# Patient Record
Sex: FEMALE | Race: WHITE | NOT HISPANIC OR LATINO | Employment: OTHER
[De-identification: names, ages, dates, MRNs, and addresses within clinical notes are randomized per-mention and may not be internally consistent; named-entity substitution may affect disease eponyms.]

---

## 2017-01-06 ENCOUNTER — HEALTH MAINTENANCE LETTER (OUTPATIENT)
Age: 52
End: 2017-01-06

## 2017-01-25 ENCOUNTER — TRANSFERRED RECORDS (OUTPATIENT)
Dept: FAMILY MEDICINE | Facility: CLINIC | Age: 52
End: 2017-01-25

## 2017-02-14 ENCOUNTER — TRANSFERRED RECORDS (OUTPATIENT)
Dept: FAMILY MEDICINE | Facility: CLINIC | Age: 52
End: 2017-02-14

## 2017-02-27 ENCOUNTER — OFFICE VISIT (OUTPATIENT)
Dept: FAMILY MEDICINE | Facility: CLINIC | Age: 52
End: 2017-02-27

## 2017-02-27 VITALS
DIASTOLIC BLOOD PRESSURE: 74 MMHG | HEIGHT: 60 IN | SYSTOLIC BLOOD PRESSURE: 116 MMHG | WEIGHT: 204.4 LBS | HEART RATE: 57 BPM | OXYGEN SATURATION: 98 % | BODY MASS INDEX: 40.13 KG/M2 | TEMPERATURE: 98.6 F

## 2017-02-27 DIAGNOSIS — N95.1 PERIMENOPAUSAL: ICD-10-CM

## 2017-02-27 DIAGNOSIS — Z01.818 PREOP GENERAL PHYSICAL EXAM: Primary | ICD-10-CM

## 2017-02-27 PROCEDURE — 99396 PREV VISIT EST AGE 40-64: CPT | Performed by: FAMILY MEDICINE

## 2017-02-27 NOTE — NURSING NOTE
Jyocelyn is here for a non-fasting physical and pap.     Patient is here for a full physical exam and pap.  Pre-Visit Screening :  Immunizations : up todate  Colonoscopy : is up to date  Mammogram : is up to date  Asthma Action Plan/Test : na  PHQ9/GAD7 : na  Pulse - regular  Medication Reconciliation: complete    [unfilled]  ETOH screening:  Questions:  1-How often do you have a drink containing alcohol?                             1 times per week(s)  2-How many drinks containing alcohol do you have on a typical day when you are         Drinking?                              2   3- How often do you have 5 or more drinks on one occasion?                              No     Have you ever:  @None of the patient's responses to the CAGE screening were positive / Negative CAGE score@    Kamryn.LANA Almaguer ([facility])

## 2017-02-27 NOTE — PROGRESS NOTES
SUBJECTIVE:  Joycelyn Mccurdy is a 51 year old G 0 P 0 woman who presents for   annual gyn exam. Patient's last menstrual period was January 10 - previous 8 months before that    Periods are : infrequent  Menopausal symptoms (FSH 68 on 10/2016);  Minimal hot flashes- feels better    Current contraception: none   TUYET exposure: no  History of abnormal Pap smear: yes -   Family history of uterine or ovarian cancer: no  Regular self breast exam: yes  History of abnormal mammogram: no  Family history of breast cancer: no  History of abnormal lipids: lipid  normal     Health Care Maintenance  Pap smear 2015 Normal  Mammogram 2016- Normal  Colonoscopy 2013 - repeat 2018  Immunizations : up to date  Exercise:  10,000 steps daily  Vitamin D : weekly 50,000 once a week    Patient Active Problem List   Diagnosis     Toxic diffuse goiter     Hypothyroidism, postablative     ACP (advance care planning)     Health Care Home     Metatarsalgia of right foot     Tubular adenoma of colon/ colonoscopy due 2018     Past Surgical History   Procedure Laterality Date     C excis knee cartilage,medial or lat  1995     C infuse radioactive materials       hyperthyroid     Colonoscopy  2013     tubular adenoma/ sessile polyp: rpt 5     Family History   Problem Relation Age of Onset     Hypertension Mother      GASTROINTESTINAL DISEASE Father      gallbladder     GASTROINTESTINAL DISEASE Paternal Grandmother      gallbladder     GASTROINTESTINAL DISEASE Paternal Uncle      gallbladder     DIABETES Father      AODM     CANCER Father      lymphoma age 71     Hypertension Sister      CANCER Mother      lung/  age 77     Current Outpatient Prescriptions   Medication     phentermine 37.5 MG capsule     topiramate (TOPAMAX) 100 MG tablet     Cholecalciferol (VITAMIN D PO)     LEVOTHYROXINE SODIUM 137 MCG OR TABS     No current facility-administered medications for this visit.      Review Of Systems  Skin:small  papule under right breast-   Eyes: due for eye exam  Ears/Nose/Throat: negative  Respiratory: No shortness of breath, dyspnea on exertion, cough, or hemoptysis  Cardiovascular: negative  Gastrointestinal: negative  Genitourinary: no problems  Musculoskeletal: negative  Neurologic: negative  Psychiatric: negative  Hematologic/Lymphatic/Immunologic: negative  Endocrine: blood work up to date  [facility] - labs reviewed    OBJECTIVE:  /74 (BP Location: Left arm, Patient Position: Chair, Cuff Size: Adult Large)  Pulse 57  Temp 98.6  F (37  C) (Oral)  Ht 1.524 m (5')  Wt 92.7 kg (204 lb 6.4 oz)  LMP 01/10/2017  SpO2 98%  BMI 39.92 kg/m2  General appearance: Healthy    Skin: Normal. No atypical appearing moles on inspection of trunk and extremities.    External ears  and canals clear bilaterally. TM's normal bilaterally. Nose normal without lesions or discharge. Oropharynx normal. Neck supple without palpable adenopathy.    Breasts are symmetric.  No dominant, discrete, fixed  or suspicious masses are noted.  No skin or nipple changes or axillary nodes.     Regular rate and  rhythm. S1 and S2 normal, no murmurs, clicks, gallops or rubs. No edema or JVD. Chest is clear; no wheezes or rales.    The abdomen is soft without tenderness, guarding, mass or organomegaly. Bowel sounds are normal. No CVA tenderness or inguinal adenopathy noted.    Pelvic:  Deferred/ no complaints of pelvic pain, discharge.    Rectal exam: deferred    Extremities: negative.    Assessment    (Z01.818) Preop general physical exam  (primary encounter diagnosis)  Comment:   Plan: recheck one year- labs and medications through [facility]    (N95.1) Perimenopausal  Comment:   Plan: talked about HRT- really no indication with her minimal symptoms

## 2017-02-27 NOTE — MR AVS SNAPSHOT
After Visit Summary   2/27/2017    Joycelyn Mccurdy    MRN: [id number]           Patient Information     Date Of Birth          1965        Visit Information        Provider Department      2/27/2017 4:30 PM Loretta Toney MD [facility]        Today's Diagnoses     Preop general physical exam    -  1    Perimenopausal           Follow-ups after your visit        Follow-up notes from your care team     Return in about 1 year (around 2/27/2018) for Routine Visit.      Who to contact     If you have questions or need follow up information about today's clinic visit or your schedule please contact [facility] directly at [phone].  Normal or non-critical lab and imaging results will be communicated to you by MyChart, letter or phone within 4 business days after the clinic has received the results. If you do not hear from us within 7 days, please contact the clinic through [employer] or phone. If you have a critical or abnormal lab result, we will notify you by phone as soon as possible.  Submit refill requests through [employer] or call your pharmacy and they will forward the refill request to us. Please allow 3 business days for your refill to be completed.          Additional Information About Your Visit        MyChart Information     [employer] gives you secure access to your electronic health record. If you see a primary care provider, you can also send messages to your care team and make appointments. If you have questions, please call your primary care clinic.  If you do not have a primary care provider, please call [phone] and they will assist you.        Care EveryWhere ID     This is your Care EveryWhere ID. This could be used by other organizations to access your [place] medical records  [id number]        Your Vitals Were     Pulse Temperature Height Last Period Pulse Oximetry BMI (Body Mass Index)    57 98.6  F (37  C) (Oral) 1.524 m  (5') 01/10/2017 98% 39.92 kg/m2       Blood Pressure from Last 3 Encounters:   02/27/17 116/74   11/02/15 124/64   04/21/14 106/64    Weight from Last 3 Encounters:   02/27/17 92.7 kg (204 lb 6.4 oz)   11/02/15 93.3 kg (205 lb 9.6 oz)   04/21/14 95.4 kg (210 lb 6.4 oz)              Today, you had the following     No orders found for display       Primary Care Provider Office Phone # Fax #    Loretta Toney [phone]       [facility] [address] [facility] [address]  [facility] [address]        Thank you!     Thank you for choosing [facility]  for your care. Our goal is always to provide you with excellent care. Hearing back from our patients is one way we can continue to improve our services. Please take a few minutes to complete the written survey that you may receive in the mail after your visit with us. Thank you!             Your Updated Medication List - Protect others around you: Learn how to safely use, store and throw away your medicines at www.disposemymeds.org.          This list is accurate as of: 2/27/17  5:16 PM.  Always use your most recent med list.                   Brand Name Dispense Instructions for use    levothyroxine 137 MCG tablet    SYNTHROID/LEVOTHROID    30    1 TABLET DAILY       phentermine 37.5 MG capsule     30 capsule    Take 1 capsule (37.5 mg) by mouth every morning       topiramate 100 MG tablet    TOPAMAX    1 tablet    One daily       VITAMIN D PO      Take 50,000 Units by mouth every 7 days.

## 2017-05-03 ENCOUNTER — TRANSFERRED RECORDS (OUTPATIENT)
Dept: FAMILY MEDICINE | Facility: CLINIC | Age: 52
End: 2017-05-03

## 2017-05-10 ENCOUNTER — TRANSFERRED RECORDS (OUTPATIENT)
Dept: FAMILY MEDICINE | Facility: CLINIC | Age: 52
End: 2017-05-10

## 2017-11-16 ENCOUNTER — TRANSFERRED RECORDS (OUTPATIENT)
Dept: FAMILY MEDICINE | Facility: CLINIC | Age: 52
End: 2017-11-16

## 2018-01-04 ENCOUNTER — OFFICE VISIT (OUTPATIENT)
Dept: FAMILY MEDICINE | Facility: CLINIC | Age: 53
End: 2018-01-04

## 2018-01-04 VITALS
OXYGEN SATURATION: 99 % | DIASTOLIC BLOOD PRESSURE: 88 MMHG | WEIGHT: 206.2 LBS | BODY MASS INDEX: 40.48 KG/M2 | HEIGHT: 60 IN | HEART RATE: 52 BPM | SYSTOLIC BLOOD PRESSURE: 128 MMHG | TEMPERATURE: 97.7 F

## 2018-01-04 DIAGNOSIS — E66.01 MORBID OBESITY (H): ICD-10-CM

## 2018-01-04 DIAGNOSIS — Z01.419 ENCOUNTER FOR GYNECOLOGICAL EXAMINATION WITHOUT ABNORMAL FINDING: Primary | ICD-10-CM

## 2018-01-04 DIAGNOSIS — J01.00 ACUTE MAXILLARY SINUSITIS, RECURRENCE NOT SPECIFIED: ICD-10-CM

## 2018-01-04 DIAGNOSIS — Z12.4 SCREENING FOR CERVICAL CANCER: ICD-10-CM

## 2018-01-04 PROCEDURE — 88142 CYTOPATH C/V THIN LAYER: CPT | Mod: 90 | Performed by: PHYSICIAN ASSISTANT

## 2018-01-04 PROCEDURE — 87624 HPV HI-RISK TYP POOLED RSLT: CPT | Mod: 90 | Performed by: PHYSICIAN ASSISTANT

## 2018-01-04 PROCEDURE — 99396 PREV VISIT EST AGE 40-64: CPT | Performed by: PHYSICIAN ASSISTANT

## 2018-01-04 NOTE — PROGRESS NOTES
Chief Complaint:  Physical Exam    SUBJECTIVE:   Joycelyn Mccurdy is a 52 year old female presents for routine health maintenance.    Current concerns: Sinus infection for 2 weeks. Not getting better.     Menses are absent    Patient's last menstrual period was 01/10/2017.    Was last Pap smear normal: Yes  Due for mammogram:  No    Body mass index is 40.27 kg/(m^2).    Present exercise habits:  Nothing formal. Planning to add weight training. [facility] follows for weight  Present dietary habits:  eats regular meals and follows a balanced nutrition diet    Calcium intake: 1-2 servings daily  Vit D intake: is not taking supplement    Is the patient a smoker? No  If yes, smoking cessation advised and counseling provided.     Cardiovascular risk factors: obesity    Over the past few weeks, have you felt down or depressed? Little interest or pleasure in doing things? No concerns    If in a relationship are there any Domestic violence concern: No    Last dental appointment:  this year  Last optical appointment:  this year    Was the patient born between [birth year] and has not had Hep C testing?  No, not applicable    I have reviewed the following histories: Past Medical History, Past Surgical History, Social History, Family History, Problem List, Medication List and Allergies    Past Medical History:   Diagnosis Date     Other postablative hypothyroidism      Family History   Problem Relation Age of Onset     Hypertension Mother      CANCER Mother      lung/  age 77     GASTROINTESTINAL DISEASE Father      gallbladder     DIABETES Father      AODM     CANCER Father      lymphoma age 71     GASTROINTESTINAL DISEASE Paternal Grandmother      gallbladder     GASTROINTESTINAL DISEASE Paternal Uncle      gallbladder     Hypertension Sister      Social History     Social History     Marital status:      Spouse name: N/A     Number of children: N/A     Years of education: N/A     Occupational History     Not on file.      Social History Main Topics     Smoking status: Former Smoker     Smokeless tobacco: Never Used      Comment: occasionally     Alcohol use 1.0 oz/week     2 Standard drinks or equivalent per week     Drug use: No     Sexual activity: Yes     Partners: Male     Other Topics Concern     Not on file     Social History Narrative     Past Surgical History:   Procedure Laterality Date     C EXCIS KNEE CARTILAGE,MEDIAL OR LAT  1995     C INFUSE RADIOACTIVE MATERIALS  2/99    hyperthyroid     COLONOSCOPY  12/2013    tubular adenoma/ sessile polyp: rpt 5       ROS:  E/M: NEGATIVE for ear, nose, mouth and throat problems  R: NEGATIVE for significant/chronic cough or SOB  CV: NEGATIVE for chest pain or palpitations  GI: NEGATIVE for abdominal pain, chronic diarrhea or constipation  :  NEGATIVE for dysuria, hematuria or vaginal discharge. No sexual health concerns.       Current Outpatient Prescriptions   Medication     amoxicillin-clavulanate (AUGMENTIN) 875-125 MG per tablet     phentermine 37.5 MG capsule     topiramate (TOPAMAX) 100 MG tablet     Cholecalciferol (VITAMIN D PO)     LEVOTHYROXINE SODIUM 137 MCG OR TABS     No current facility-administered medications for this visit.        Patient Active Problem List    Diagnosis Date Noted     Morbid obesity (H) 01/04/2018     Priority: Medium     Tubular adenoma of colon/ colonoscopy due 12/2018 11/02/2015     Priority: Medium     Metatarsalgia of right foot 04/22/2014     Priority: Medium     Hypothyroidism, postablative      Priority: Medium     Problem list name updated by automated process. Provider to review       Toxic diffuse goiter      Priority: Medium     Problem list name updated by automated process. Provider to review       Health Care Home 06/18/2013     Priority: Low     State Tier Level:  Tier 1  Status:  n/a  Care Coordinator: n/a    See Letters for H Care Plan                                                       ACP (advance care planning)  "05/24/2012     Priority: Low     Advance Care Planning 11/2/2015: ACP Review and Resources Provided:  Reviewed chart for advance care plan.  Joycelyn Mccurdy has no plan or code status on file. Discussed available resources and provided with information. Confirmed code status reflects current choices pending further ACP discussions.  Confirmed/documented legally designated decision maker(s). Added by Jess Leija                 OBJECTIVE:  /88 (BP Location: Left arm, Patient Position: Chair, Cuff Size: Adult Large)  Pulse 52  Temp 97.7  F (36.5  C) (Oral)  Ht 1.524 m (5')  Wt 93.5 kg (206 lb 3.2 oz)  LMP 01/10/2017  SpO2 99%  BMI 40.27 kg/m2        General: 52 year old female who appears her stated age. Vital signs noted  Head: Normocephalic  Eyes: pupils equal round reactive to light and accomodation, extra ocular movements intact  Ears: external canals and TMs free of abnormalities  Nose: patent, without mucosal abnormalities  Mouth and throat: without erythema or lesions of the mucosa  Neck: supple, without adenopathy or thyromegaly  Lungs: clear to auscultation, no wheezing or crackles  Breasts: skin without rash, no dominant mass, no nipple discharge, or axillary adenopathy  Cv: regular rate and rhythm, normal s1 and s2 without murmur or click  Abd: soft, non-tender, no masses, no hepatomegaly or splenomegaly.   (female): normal female external genitalia, normal urethral meatus, vaginal mucosa, normal cervix/adnexa/uterus without masses or discharge  Ms: normal muscle tone & symmetry  Skin: clear to inspection and with no palpable abnormalities.  Neuro: sensation and motor function grossly intact; cranial nerves without obvious abnormalities.    ASSESSMENT/PLAN:    1. Encounter for gynecological examination without abnormal finding  2. Morbid obesity (H)  Joycelyn is doing well, and will hopefully begin to see some weight loss working with \"Dr. Roper\" at [facility]. I do not have records of her " latest labs there, but she will request them for me.     3. Screening for cervical cancer  Will do co-testing and contact via [employer] with results when available.   - ThinPrep Pap and HPV mRna E6/E7 ([facility])    4. Acute maxillary sinusitis, recurrence not specified  Given duration of symptoms, recommended she complete 10 day course of Augmentin. She should take this twice daily with food, and should consider taking probiotic or eating yogurt in between. Warned her of possible side effects including loose stool.  She should contact me in 1 week if symptoms are not improving, or sooner with any intolerable side effects or worsening symptoms.  - amoxicillin-clavulanate (AUGMENTIN) 875-125 MG per tablet; Take 1 tablet by mouth 2 times daily for 10 days  Dispense: 20 tablet; Refill: 0     reports that she has quit smoking. She has never used smokeless tobacco.      Estimated body mass index is 40.27 kg/(m^2) as calculated from the following:    Height as of this encounter: 1.524 m (5').    Weight as of this encounter: 93.5 kg (206 lb 3.2 oz).  Weight management plan: Patient referred to endocrine and/or weight management specialty      Labs pending:        Pap smear  Meds Suggested:      Vitamin D       Calcium  Tests Recommended:      Regular Dental Examinations        Eye exam  Behavior Modifications:       Cardiovascular exercise 3 times per week--enough to get your Target Heart rate  Other recommendations:     BMI noted and discussed      Regular breast exam     Encouraged My Chart    Counseling Resources:  ATP IV Guidelines  Pooled Cohorts Equation Calculator  Breast Cancer Risk Calculator  FRAX Risk Assessment  ICSI Preventive Guidelines  Dietary Guidelines for Americans, 2010  USDA's MyPlate            Carly Schmidt PA-C  1/4/2018

## 2018-01-04 NOTE — MR AVS SNAPSHOT
After Visit Summary   1/4/2018    Joycelyn Mccurdy    MRN: [id number]           Patient Information     Date Of Birth          1965        Visit Information        Provider Department      1/4/2018 11:00 AM Carly Schmidt PA-C [facility]        Today's Diagnoses     Encounter for gynecological examination without abnormal finding    -  1    Morbid obesity (H)        Screening for cervical cancer        Acute maxillary sinusitis, recurrence not specified           Follow-ups after your visit        Follow-up notes from your care team     Return in about 1 year (around 1/4/2019) for Physical Exam.      Who to contact     If you have questions or need follow up information about today's clinic visit or your schedule please contact [facility] directly at [phone].  Normal or non-critical lab and imaging results will be communicated to you by MyChart, letter or phone within 4 business days after the clinic has received the results. If you do not hear from us within 7 days, please contact the clinic through MyChart or phone. If you have a critical or abnormal lab result, we will notify you by phone as soon as possible.  Submit refill requests through [employer] or call your pharmacy and they will forward the refill request to us. Please allow 3 business days for your refill to be completed.          Additional Information About Your Visit        MyChart Information     [employer] gives you secure access to your electronic health record. If you see a primary care provider, you can also send messages to your care team and make appointments. If you have questions, please call your primary care clinic.  If you do not have a primary care provider, please call [phone] and they will assist you.        Care EveryWhere ID     This is your Care EveryWhere ID. This could be used by other organizations to access your [facility]  records  [id number]        Your Vitals Were     Pulse Temperature Height Last Period Pulse Oximetry BMI (Body Mass Index)    52 97.7  F (36.5  C) (Oral) 1.524 m (5') 01/10/2017 99% 40.27 kg/m2       Blood Pressure from Last 3 Encounters:   01/04/18 128/88   02/27/17 116/74   11/02/15 124/64    Weight from Last 3 Encounters:   01/04/18 93.5 kg (206 lb 3.2 oz)   02/27/17 92.7 kg (204 lb 6.4 oz)   11/02/15 93.3 kg (205 lb 9.6 oz)              We Performed the Following     ThinPrep Pap and HPV mRna E6/E7 ([facility])          Today's Medication Changes          These changes are accurate as of: 1/4/18  3:20 PM.  If you have any questions, ask your nurse or doctor.               Start taking these medicines.        Dose/Directions    amoxicillin-clavulanate 875-125 MG per tablet   Commonly known as:  AUGMENTIN   Used for:  Acute maxillary sinusitis, recurrence not specified   Started by:  Carly Schmidt PA-C        Dose:  1 tablet   Take 1 tablet by mouth 2 times daily for 10 days   Quantity:  20 tablet   Refills:  0            Where to get your medicines      Some of these will need a paper prescription and others can be bought over the counter.  Ask your nurse if you have questions.     Bring a paper prescription for each of these medications     amoxicillin-clavulanate 875-125 MG per tablet                Primary Care Provider Office Phone # Fax #    Loretta Toney [phone] [address] [address]        Equal Access to Services     [facility]: Hadii leana caceres hadasho Sonataly, waaxda luqadaha, qaybta kaalmada adedariel, adelfo hernández. So [facility] [phone].    ATENCIÓN: Si habla español, tiene a park disposición servicios gratuitos de asistencia lingüística. Llame al [phone].    We comply with applicable federal civil rights laws and [place] laws. We do not discriminate on the basis of race, color, national origin, age,  disability, sex, sexual orientation, or gender identity.            Thank you!     Thank you for choosing [facility]  for your care. Our goal is always to provide you with excellent care. Hearing back from our patients is one way we can continue to improve our services. Please take a few minutes to complete the written survey that you may receive in the mail after your visit with us. Thank you!             Your Updated Medication List - Protect others around you: Learn how to safely use, store and throw away your medicines at www.disposemymeds.org.          This list is accurate as of: 1/4/18  3:20 PM.  Always use your most recent med list.                   Brand Name Dispense Instructions for use Diagnosis    amoxicillin-clavulanate 875-125 MG per tablet    AUGMENTIN    20 tablet    Take 1 tablet by mouth 2 times daily for 10 days    Acute maxillary sinusitis, recurrence not specified       levothyroxine 137 MCG tablet    SYNTHROID/LEVOTHROID    30    1 TABLET DAILY    Other postablative hypothyroidism       phentermine 37.5 MG capsule     30 capsule    Take 1 capsule (37.5 mg) by mouth every morning        topiramate 100 MG tablet    TOPAMAX    1 tablet    One daily        VITAMIN D PO      Take 50,000 Units by mouth every 7 days.

## 2018-01-04 NOTE — NURSING NOTE
Joycelyn is here for CPX with pap no blood work necessary per pt    Patient is here for a full physical exam.    Pre-Visit Screening :  Immunizations : up to date  Colon Screening : is up to date  Mammogram: UTD  Asthma Action Test/Plan : NA  PHQ9 :  None  GAD7 :  None  Patient's  BMI Body mass index is 40.27 kg/(m^2).  Questioned patient about current smoking habits.  Pt. quit smoking some time ago.  OK to leave a detailed voice message regarding today's visit Yes, phone # [phone]      ETOH screening:  Questions:  1-How often do you have a drink containing alcohol?                             2 times per month(s)  2-How many drinks containing alcohol do you have on a typical day when you are         Drinking?                              2   3- How often do you have 5 or more drinks on one occasion?                              Never

## 2018-01-09 LAB
CLINICAL HISTORY - QUEST: NORMAL
CYTOTECHNOLOGIST - QUEST: NORMAL
DESCRIPTIVE DIAGNOSIS - QUEST: NORMAL
HPV MRNA E6/E7: NOT DETECTED
LAST PAP DX - QUEST: NORMAL
LMP - QUEST: NORMAL
PREV BX DX - QUEST: NORMAL
SOURCE: NORMAL
STATEMENT OF ADEQUACY - QUEST: NORMAL

## 2018-01-31 ENCOUNTER — TRANSFERRED RECORDS (OUTPATIENT)
Dept: FAMILY MEDICINE | Facility: CLINIC | Age: 53
End: 2018-01-31

## 2018-11-30 ENCOUNTER — TRANSFERRED RECORDS (OUTPATIENT)
Dept: FAMILY MEDICINE | Facility: CLINIC | Age: 53
End: 2018-11-30

## 2019-02-19 ENCOUNTER — TRANSFERRED RECORDS (OUTPATIENT)
Dept: FAMILY MEDICINE | Facility: CLINIC | Age: 54
End: 2019-02-19

## 2019-09-27 ENCOUNTER — HEALTH MAINTENANCE LETTER (OUTPATIENT)
Age: 54
End: 2019-09-27

## 2019-10-10 ENCOUNTER — TRANSFERRED RECORDS (OUTPATIENT)
Dept: FAMILY MEDICINE | Facility: CLINIC | Age: 54
End: 2019-10-10

## 2020-02-18 ENCOUNTER — TRANSFERRED RECORDS (OUTPATIENT)
Dept: FAMILY MEDICINE | Facility: CLINIC | Age: 55
End: 2020-02-18

## 2021-01-09 ENCOUNTER — HEALTH MAINTENANCE LETTER (OUTPATIENT)
Age: 56
End: 2021-01-09

## 2021-10-23 ENCOUNTER — HEALTH MAINTENANCE LETTER (OUTPATIENT)
Age: 56
End: 2021-10-23

## 2021-12-18 ENCOUNTER — HEALTH MAINTENANCE LETTER (OUTPATIENT)
Age: 56
End: 2021-12-18

## 2022-02-12 ENCOUNTER — HEALTH MAINTENANCE LETTER (OUTPATIENT)
Age: 57
End: 2022-02-12

## 2022-10-09 ENCOUNTER — HEALTH MAINTENANCE LETTER (OUTPATIENT)
Age: 57
End: 2022-10-09

## 2023-01-23 ENCOUNTER — TRANSFERRED RECORDS (OUTPATIENT)
Dept: FAMILY MEDICINE | Facility: CLINIC | Age: 58
End: 2023-01-23

## 2023-03-25 ENCOUNTER — HEALTH MAINTENANCE LETTER (OUTPATIENT)
Age: 58
End: 2023-03-25

## 2024-01-06 ENCOUNTER — HEALTH MAINTENANCE LETTER (OUTPATIENT)
Age: 59
End: 2024-01-06

## 2024-03-27 ENCOUNTER — OFFICE VISIT (OUTPATIENT)
Dept: FAMILY MEDICINE | Facility: CLINIC | Age: 59
End: 2024-03-27

## 2024-03-27 VITALS
SYSTOLIC BLOOD PRESSURE: 114 MMHG | DIASTOLIC BLOOD PRESSURE: 72 MMHG | BODY MASS INDEX: 41.62 KG/M2 | TEMPERATURE: 98 F | OXYGEN SATURATION: 99 % | HEART RATE: 65 BPM | HEIGHT: 60 IN | WEIGHT: 212 LBS

## 2024-03-27 DIAGNOSIS — E66.01 MORBID OBESITY (H): ICD-10-CM

## 2024-03-27 DIAGNOSIS — Z01.419 ENCOUNTER FOR GYNECOLOGICAL EXAMINATION WITHOUT ABNORMAL FINDING: Primary | ICD-10-CM

## 2024-03-27 DIAGNOSIS — Z12.31 ENCOUNTER FOR SCREENING MAMMOGRAM FOR BREAST CANCER: ICD-10-CM

## 2024-03-27 DIAGNOSIS — Z12.4 SCREENING FOR CERVICAL CANCER: ICD-10-CM

## 2024-03-27 DIAGNOSIS — E89.0 HYPOTHYROIDISM, POSTABLATIVE: ICD-10-CM

## 2024-03-27 DIAGNOSIS — Z12.11 SPECIAL SCREENING FOR MALIGNANT NEOPLASMS, COLON: ICD-10-CM

## 2024-03-27 PROCEDURE — 88175 CYTOPATH C/V AUTO FLUID REDO: CPT | Mod: 90 | Performed by: PHYSICIAN ASSISTANT

## 2024-03-27 PROCEDURE — 87624 HPV HI-RISK TYP POOLED RSLT: CPT | Mod: 90 | Performed by: PHYSICIAN ASSISTANT

## 2024-03-27 PROCEDURE — 99459 PELVIC EXAMINATION: CPT | Performed by: PHYSICIAN ASSISTANT

## 2024-03-27 PROCEDURE — 99396 PREV VISIT EST AGE 40-64: CPT | Performed by: PHYSICIAN ASSISTANT

## 2024-03-27 RX ORDER — ERGOCALCIFEROL 1.25 MG/1
50000 CAPSULE, LIQUID FILLED ORAL WEEKLY
COMMUNITY
Start: 2023-07-26

## 2024-03-27 RX ORDER — AMOXICILLIN 500 MG/1
2000 TABLET, FILM COATED ORAL
COMMUNITY
Start: 2023-10-30

## 2024-03-27 NOTE — NURSING NOTE
"Chief Complaint   Patient presents with    Physical     Non fasting px with PAP  goes to endo every October and does labs, pt will get results sent to [facility]    New Patient     New \"old\" patient    Referral     Discuss referral to derm for dark spots         Pre-visit Screening:  Immunizations:  not up to date - Shingrix and RSV at pharmacy  Colonoscopy:  is scheduled for next month  Mammogram: is scheduled for next month  Asthma Action Test/Plan:  NA  PHQ9:  NA  GAD7:  NA  Questioned patient about current smoking habits Pt. has never smoked.  Ok to leave detailed message on voice mail for today's visit only Yes, phone # [phone]      "

## 2024-03-27 NOTE — PROGRESS NOTES
Chief Complaint:  Physical Exam    SUBJECTIVE:   Joycelyn Mccurdy is a 58 year old female presents for routine health maintenance.    Current concerns: Extensive fasting labs through [facility] yearly- 10/2023. Pt plans to have records resent.    Menses are absent    Patient's last menstrual period was 01/10/2017.    Was last Pap smear normal: Yes  Due for mammogram:  Yes, scheduled for next week.     Body mass index is 41.4 kg/m .    Present exercise habits:  3-4 times/week  Present dietary habits:  eats regular meals and follows a balanced nutrition diet    Calcium intake:  3 servings daily.   Vit D intake: is taking supplement, 31571 U weekly.     Is the patient a smoker? No  If yes, smoking cessation advised and counseling provided.     Cardiovascular risk factors: obesity    Over the past few weeks, have you felt down or depressed? Little interest or pleasure in doing things? No concerns    If in a relationship are there any Domestic violence concern: No    Last dental appointment:  this year  Last optical appointment:  this year    Was the patient born between [birth year] and has not had Hep C testing?  Patient has already been tested    I have reviewed the following histories: Past Medical History, Past Surgical History, Social History, Family History, Problem List, Medication List and Allergies    Past Medical History:   Diagnosis Date    Other postablative hypothyroidism      Family History   Problem Relation Age of Onset    Hypertension Mother     Cancer Mother         lung/  age 77    Gastrointestinal Disease Father         gallbladder    Diabetes Father         AODM    Cancer Father         lymphoma age 71    Hypertension Sister     Hypertension Sister     Gallbladder Disease Sister     Gastrointestinal Disease Paternal Grandmother         gallbladder    Gastrointestinal Disease Paternal Uncle         gallbladder     Social History     Socioeconomic History    Marital status:      Spouse name:  Not on file    Number of children: Not on file    Years of education: Not on file    Highest education level: Not on file   Occupational History    Not on file   Tobacco Use    Smoking status: Never     Passive exposure: Never    Smokeless tobacco: Never   Substance and Sexual Activity    Alcohol use: Yes     Alcohol/week: 3.0 standard drinks of alcohol     Types: 3 Standard drinks or equivalent per week    Drug use: No    Sexual activity: Yes     Partners: Male     Birth control/protection: Post-menopausal   Other Topics Concern    Not on file   Social History Narrative    Not on file     Social Determinants of Health     Financial Resource Strain: Not on file   Food Insecurity: Not on file   Transportation Needs: Not on file   Physical Activity: Not on file   Stress: Not on file   Social Connections: Not on file   Interpersonal Safety: Not on file   Housing Stability: Not on file     Past Surgical History:   Procedure Laterality Date    CHOLECYSTECTOMY  2003    approximately 2003    COLONOSCOPY  12/01/2013    tubular adenoma/ sessile polyp: rpt 5    REPLACEMENT TOTAL KNEE Right 09/2022    REPLACEMENT TOTAL KNEE Left 12/2023    ZZC EXCIS KNEE CARTILAGE,MEDIAL OR LAT  01/01/1995    [facility]C INFUSE RADIOACTIVE MATERIALS  02/01/1999    hyperthyroid       ROS:  E/M: NEGATIVE for ear, nose, mouth and throat problems  R: NEGATIVE for significant/chronic cough or SOB  CV: NEGATIVE for chest pain or palpitations  GI: NEGATIVE for abdominal pain, chronic diarrhea or constipation  :  NEGATIVE for dysuria, hematuria or vaginal discharge. No sexual health concerns.       Current Outpatient Medications   Medication    amoxicillin (AMOXIL) 500 MG tablet    LEVOTHYROXINE SODIUM 137 MCG OR TABS    UNABLE TO FIND    vitamin D2 (ERGOCALCIFEROL) 15959 units (1250 mcg) capsule     No current facility-administered medications for this visit.       Patient Active Problem List    Diagnosis Date Noted    Morbid obesity (H) 01/04/2018      Priority: Medium    Tubular adenoma of colon/ colonoscopy due 12/2018 11/02/2015     Priority: Medium    Metatarsalgia of right foot 04/22/2014     Priority: Medium    Hypothyroidism, postablative      Priority: Medium     Problem list name updated by automated process. Provider to review      Toxic diffuse goiter      Priority: Medium     Problem list name updated by automated process. Provider to review      Health Care Home 06/18/2013     Priority: Low     State Tier Level:  Tier 1  Status:  n/a  Care Coordinator: n/a    See Letters for H Care Plan                                                      ACP (advance care planning) 05/24/2012     Priority: Low     Advance Care Planning 11/2/2015: ACP Review and Resources Provided:  Reviewed chart for advance care plan.  Joycelyn Mccurdy has no plan or code status on file. Discussed available resources and provided with information. Confirmed code status reflects current choices pending further ACP discussions.  Confirmed/documented legally designated decision maker(s). Added by Jess Leija               OBJECTIVE:  /72 (BP Location: Left arm, Patient Position: Sitting, Cuff Size: Adult Large)   Pulse 65   Temp 98  F (36.7  C) (Temporal)   Ht 1.524 m (5')   Wt 96.2 kg (212 lb)   LMP 01/10/2017   SpO2 99%   BMI 41.40 kg/m      General: 58 year old female who appears her stated age. Vital signs noted.  Head: Normocephalic  Eyes: pupils equal round reactive to light and accomodation, extra ocular movements intact  Ears: external canals and TMs free of abnormalities  Nose: patent, without mucosal abnormalities  Mouth and throat: without erythema or lesions of the mucosa  Neck: supple, without adenopathy or thyromegaly  Lungs: clear to auscultation, no wheezing or crackles  Breasts: skin without rash, no dominant mass, no nipple discharge, or axillary adenopathy.  Cv: regular rate and rhythm, normal s1 and s2 without murmur or click  Abd: soft,  non-tender, no masses, no hepatomegaly or splenomegaly.   (female): normal female external genitalia, normal urethral meatus, vaginal mucosa, normal cervix/adnexa/uterus without masses or discharge.  Ms: normal muscle tone & symmetry  Skin: clear to inspection and with no palpable abnormalities.  Neuro: sensation and motor function grossly intact; cranial nerves without obvious abnormalities.    ASSESSMENT/PLAN:    Encounter for gynecological examination without abnormal finding  Joycelyn is doing well today. She will get records from [facility], so no indication fasting labs.     Special screening for malignant neoplasms, colon  - Colonoscopy Screening  Referral    Encounter for screening mammogram for breast cancer  - MA Screening Bilateral w/ Alan  - Radiology Referral    Hypothyroidism, postablative    Morbid obesity (H)     reports that she has never smoked. She has never been exposed to tobacco smoke. She has never used smokeless tobacco.    Estimated body mass index is 41.4 kg/m  as calculated from the following:    Height as of this encounter: 1.524 m (5').    Weight as of this encounter: 96.2 kg (212 lb).  Weight management plan: Discussed healthy diet and exercise guidelines    Labs pending:      Fasting glucose      Fasting lipids       Pap smear  Meds Suggested:      Vitamin D       Calcium  Tests Recommended:      Regular Dental Examinations        Eye exam  Behavior Modifications:       Cardiovascular exercise 3 times per week--enough to get your Target Heart rate  Other recommendations:     BMI noted and discussed      Regular breast exam     Encouraged My Chart    Counseling Resources:  ATP IV Guidelines  Pooled Cohorts Equation Calculator  Breast Cancer Risk Calculator  FRAX Risk Assessment  ICSI Preventive Guidelines  Dietary Guidelines for Americans, 2010  [facility]'s MyPlate      Carly William PA-C  3/27/2024

## 2024-03-29 LAB
CLINICAL HISTORY - QUEST: NORMAL
COMMENT - QUEST: NORMAL
COMMENT - QUEST: NORMAL
CYTOTECHNOLOGIST - QUEST: NORMAL
DESCRIPTIVE DIAGNOSIS - QUEST: NORMAL
HPV MRNA E6/E7: NOT DETECTED
LAST PAP DX - QUEST: NORMAL
LMP - QUEST: NORMAL
PREV BX DX - QUEST: NORMAL
SOURCE: NORMAL
STATEMENT OF ADEQUACY - QUEST: NORMAL

## 2024-04-02 ENCOUNTER — PATIENT OUTREACH (OUTPATIENT)
Dept: GASTROENTEROLOGY | Facility: CLINIC | Age: 59
End: 2024-04-02

## 2024-04-02 LAB — MAMMOGRAM: NORMAL

## 2024-04-30 ENCOUNTER — TRANSFERRED RECORDS (OUTPATIENT)
Dept: FAMILY MEDICINE | Facility: CLINIC | Age: 59
End: 2024-04-30

## 2025-05-03 ENCOUNTER — HEALTH MAINTENANCE LETTER (OUTPATIENT)
Age: 60
End: 2025-05-03